# Patient Record
Sex: FEMALE | Race: WHITE | Employment: UNEMPLOYED | URBAN - METROPOLITAN AREA
[De-identification: names, ages, dates, MRNs, and addresses within clinical notes are randomized per-mention and may not be internally consistent; named-entity substitution may affect disease eponyms.]

---
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
## 2022-01-05 ENCOUNTER — APPOINTMENT (OUTPATIENT)
Dept: GENERAL RADIOLOGY | Facility: HOSPITAL | Age: 22
End: 2022-01-05
Attending: EMERGENCY MEDICINE
Payer: MEDICAID

## 2022-01-05 ENCOUNTER — HOSPITAL ENCOUNTER (EMERGENCY)
Facility: HOSPITAL | Age: 22
Discharge: HOME | End: 2022-01-05
Attending: EMERGENCY MEDICINE
Payer: MEDICAID

## 2022-01-05 VITALS
OXYGEN SATURATION: 96 % | RESPIRATION RATE: 18 BRPM | HEART RATE: 83 BPM | DIASTOLIC BLOOD PRESSURE: 85 MMHG | SYSTOLIC BLOOD PRESSURE: 127 MMHG | TEMPERATURE: 98 F

## 2022-01-05 DIAGNOSIS — R07.9 CHEST PAIN OF UNCERTAIN ETIOLOGY: Primary | ICD-10-CM

## 2022-01-05 LAB
ANION GAP SERPL CALC-SCNC: 6 MMOL/L (ref 0–18)
BASOPHILS # BLD AUTO: 0.03 X10(3) UL (ref 0–0.2)
BASOPHILS NFR BLD AUTO: 0.3 %
BUN BLD-MCNC: 7 MG/DL (ref 7–18)
BUN/CREAT SERPL: 10 (ref 10–20)
CALCIUM BLD-MCNC: 8.9 MG/DL (ref 8.5–10.1)
CHLORIDE SERPL-SCNC: 107 MMOL/L (ref 98–112)
CO2 SERPL-SCNC: 26 MMOL/L (ref 21–32)
CREAT BLD-MCNC: 0.7 MG/DL
DEPRECATED RDW RBC AUTO: 38.9 FL (ref 35.1–46.3)
EOSINOPHIL # BLD AUTO: 0.13 X10(3) UL (ref 0–0.7)
EOSINOPHIL NFR BLD AUTO: 1.4 %
ERYTHROCYTE [DISTWIDTH] IN BLOOD BY AUTOMATED COUNT: 13.5 % (ref 11–15)
GLUCOSE BLD-MCNC: 96 MG/DL (ref 70–99)
HCT VFR BLD AUTO: 42.8 %
HGB BLD-MCNC: 13.7 G/DL
IMM GRANULOCYTES # BLD AUTO: 0.04 X10(3) UL (ref 0–1)
IMM GRANULOCYTES NFR BLD: 0.4 %
LYMPHOCYTES # BLD AUTO: 2.03 X10(3) UL (ref 1–4)
LYMPHOCYTES NFR BLD AUTO: 22.6 %
MCH RBC QN AUTO: 25.6 PG (ref 26–34)
MCHC RBC AUTO-ENTMCNC: 32 G/DL (ref 31–37)
MCV RBC AUTO: 80 FL
MONOCYTES # BLD AUTO: 0.7 X10(3) UL (ref 0.1–1)
MONOCYTES NFR BLD AUTO: 7.8 %
NEUTROPHILS # BLD AUTO: 6.05 X10 (3) UL (ref 1.5–7.7)
NEUTROPHILS # BLD AUTO: 6.05 X10(3) UL (ref 1.5–7.7)
NEUTROPHILS NFR BLD AUTO: 67.5 %
OSMOLALITY SERPL CALC.SUM OF ELEC: 286 MOSM/KG (ref 275–295)
PLATELET # BLD AUTO: 271 10(3)UL (ref 150–450)
POTASSIUM SERPL-SCNC: 3.8 MMOL/L (ref 3.5–5.1)
RBC # BLD AUTO: 5.35 X10(6)UL
SODIUM SERPL-SCNC: 139 MMOL/L (ref 136–145)
TROPONIN I HIGH SENSITIVITY: 5 NG/L
WBC # BLD AUTO: 9 X10(3) UL (ref 4–11)

## 2022-01-05 PROCEDURE — 93010 ELECTROCARDIOGRAM REPORT: CPT | Performed by: EMERGENCY MEDICINE

## 2022-01-05 PROCEDURE — 80048 BASIC METABOLIC PNL TOTAL CA: CPT | Performed by: EMERGENCY MEDICINE

## 2022-01-05 PROCEDURE — 85025 COMPLETE CBC W/AUTO DIFF WBC: CPT | Performed by: EMERGENCY MEDICINE

## 2022-01-05 PROCEDURE — 93005 ELECTROCARDIOGRAM TRACING: CPT

## 2022-01-05 PROCEDURE — 36415 COLL VENOUS BLD VENIPUNCTURE: CPT

## 2022-01-05 PROCEDURE — 71045 X-RAY EXAM CHEST 1 VIEW: CPT | Performed by: EMERGENCY MEDICINE

## 2022-01-05 PROCEDURE — 99284 EMERGENCY DEPT VISIT MOD MDM: CPT

## 2022-01-05 PROCEDURE — 84484 ASSAY OF TROPONIN QUANT: CPT | Performed by: EMERGENCY MEDICINE

## 2022-01-05 NOTE — ED INITIAL ASSESSMENT (HPI)
Pt reports chest pain, hx of anxiety, panic disorder, pt reports sore mouth and throat, pt currently being treated for bad tooth, pt reports \"I took my anxiety medication but may have given additional dose because she couldn't remember if she took her usu

## 2022-01-05 NOTE — ED PROVIDER NOTES
TightnessPatient [facility] in[facility] AND [facility] Emergency Department    History   Patient presents with:  Chest Pain Angina      HPI    25-year-old female presents the ER with complaint of. Patient states that the pain started earlier this morning.   Patient b my examination was cleaned with super sani-cloth germicidal wipes following the exam.     Physical Exam  Vitals and nursing note reviewed. Constitutional:       Appearance: She is well-developed. HENT:      Head: Normocephalic and atraumatic.       Righ orders. EKG    Rate, intervals and axes as noted on EKG Report. Rate: 94  Rhythm: Sinus Rhythm  Reading: No ST deviation.              Imaging Results Available and Reviewed while in ED: XR CHEST AP PORTABLE  (CPT=71045)    Result Date: 1/5/2022  CONCL visit  If symptoms worsen      Medications Prescribed:  Current Discharge Medication List

## 2022-07-27 ENCOUNTER — APPOINTMENT (OUTPATIENT)
Dept: CT IMAGING | Facility: HOSPITAL | Age: 22
End: 2022-07-27
Attending: NURSE PRACTITIONER
Payer: MEDICAID

## 2022-07-27 ENCOUNTER — HOSPITAL ENCOUNTER (EMERGENCY)
Facility: HOSPITAL | Age: 22
Discharge: HOME | End: 2022-07-27
Payer: MEDICAID

## 2022-07-27 VITALS
WEIGHT: 205 LBS | SYSTOLIC BLOOD PRESSURE: 111 MMHG | RESPIRATION RATE: 18 BRPM | DIASTOLIC BLOOD PRESSURE: 72 MMHG | HEART RATE: 69 BPM | HEIGHT: 64 IN | BODY MASS INDEX: 35 KG/M2 | TEMPERATURE: 98 F | OXYGEN SATURATION: 99 %

## 2022-07-27 DIAGNOSIS — R51.9 ACUTE NONINTRACTABLE HEADACHE, UNSPECIFIED HEADACHE TYPE: Primary | ICD-10-CM

## 2022-07-27 LAB — B-HCG UR QL: NEGATIVE

## 2022-07-27 PROCEDURE — 81025 URINE PREGNANCY TEST: CPT

## 2022-07-27 PROCEDURE — 96374 THER/PROPH/DIAG INJ IV PUSH: CPT

## 2022-07-27 PROCEDURE — 96361 HYDRATE IV INFUSION ADD-ON: CPT

## 2022-07-27 PROCEDURE — 99284 EMERGENCY DEPT VISIT MOD MDM: CPT

## 2022-07-27 PROCEDURE — 96375 TX/PRO/DX INJ NEW DRUG ADDON: CPT

## 2022-07-27 PROCEDURE — 70450 CT HEAD/BRAIN W/O DYE: CPT | Performed by: NURSE PRACTITIONER

## 2022-07-27 RX ORDER — METOCLOPRAMIDE HYDROCHLORIDE 5 MG/ML
10 INJECTION INTRAMUSCULAR; INTRAVENOUS ONCE
Status: COMPLETED | OUTPATIENT
Start: 2022-07-27 | End: 2022-07-27

## 2022-07-27 RX ORDER — ONDANSETRON 2 MG/ML
4 INJECTION INTRAMUSCULAR; INTRAVENOUS ONCE
Status: COMPLETED | OUTPATIENT
Start: 2022-07-27 | End: 2022-07-27

## 2022-07-27 RX ORDER — DIPHENHYDRAMINE HYDROCHLORIDE 50 MG/ML
25 INJECTION INTRAMUSCULAR; INTRAVENOUS ONCE
Status: COMPLETED | OUTPATIENT
Start: 2022-07-27 | End: 2022-07-27

## 2022-10-14 NOTE — ED AVS SNAPSHOT
Francy Jan   MRN: [id number]    Department:  [facility] Emergency Department   Date of Visit:  11/24/2019           Disclosure     Insurance plans vary and the physician(s) referred by the ER may not be covered by your plan.  Please contact your CARE PHYSICIAN AT ONCE OR RETURN IMMEDIATELY TO THE EMERGENCY DEPARTMENT. If you have been prescribed any medication(s), please fill your prescription right away and begin taking the medication(s) as directed.   If you believe that any of the medications Were Done

## 2025-03-21 ENCOUNTER — OFFICE VISIT (OUTPATIENT)
Dept: OBGYN CLINIC | Facility: CLINIC | Age: 25
End: 2025-03-21

## 2025-03-21 VITALS
BODY MASS INDEX: 40.12 KG/M2 | SYSTOLIC BLOOD PRESSURE: 116 MMHG | WEIGHT: 235 LBS | DIASTOLIC BLOOD PRESSURE: 74 MMHG | HEART RATE: 93 BPM | HEIGHT: 64 IN

## 2025-03-21 DIAGNOSIS — N97.0 ANOVULATORY BLEEDING: Primary | ICD-10-CM

## 2025-03-21 PROCEDURE — 99203 OFFICE O/P NEW LOW 30 MIN: CPT | Performed by: STUDENT IN AN ORGANIZED HEALTH CARE EDUCATION/TRAINING PROGRAM

## 2025-03-21 RX ORDER — DROSPIRENONE 4 MG/1
1 TABLET, FILM COATED ORAL DAILY
Qty: 84 TABLET | Refills: 4 | Status: CANCELLED | OUTPATIENT
Start: 2025-03-21 | End: 2026-03-21

## 2025-03-21 RX ORDER — SERTRALINE HYDROCHLORIDE 100 MG/1
200 TABLET, FILM COATED ORAL DAILY
COMMUNITY
Start: 2023-01-03

## 2025-03-21 RX ORDER — METFORMIN HYDROCHLORIDE 500 MG/1
500 TABLET, EXTENDED RELEASE ORAL DAILY
COMMUNITY
Start: 2024-09-11

## 2025-03-21 RX ORDER — MEDROXYPROGESTERONE ACETATE 10 MG
TABLET ORAL
Qty: 10 TABLET | Refills: 5 | Status: SHIPPED | OUTPATIENT
Start: 2025-03-21

## 2025-03-21 RX ORDER — TIRZEPATIDE 5 MG/.5ML
5 INJECTION, SOLUTION SUBCUTANEOUS WEEKLY
COMMUNITY
Start: 2025-03-17 | End: 2025-03-21

## 2025-03-21 RX ORDER — SUMATRIPTAN 50 MG/1
50 TABLET, FILM COATED ORAL
COMMUNITY
Start: 2023-03-19

## 2025-03-21 RX ORDER — CLONAZEPAM 0.5 MG/1
1 TABLET ORAL AS DIRECTED
COMMUNITY
Start: 2022-12-12

## 2025-03-21 RX ORDER — ATORVASTATIN CALCIUM 80 MG/1
80 TABLET, FILM COATED ORAL NIGHTLY
COMMUNITY
Start: 2024-05-01

## 2025-03-21 RX ORDER — NYSTATIN 100000 [USP'U]/G
3 POWDER TOPICAL 3 TIMES DAILY PRN
Qty: 60 G | Refills: 0 | Status: SHIPPED | OUTPATIENT
Start: 2025-03-21

## 2025-03-21 RX ORDER — BUPROPION HYDROCHLORIDE 150 MG/1
2 TABLET ORAL AS DIRECTED
COMMUNITY
Start: 2022-09-15

## 2025-03-21 RX ORDER — SPIRONOLACTONE 25 MG/1
TABLET ORAL
COMMUNITY
Start: 2025-03-17 | End: 2025-06-15

## 2025-03-21 NOTE — PROGRESS NOTES
[facility]  Obstetrics and Gynecology  Gynecology New Patient  Exam    No chief complaint on file.        Nicky Gomez is a 24 year old female presenting as a new patient for AUB and pain.    Right sided pain and now intermittently right and left (but more right). Worse when laying on hip, but better when laying on back or stomach. Has frequent migraines for which takes sumatriptan, doesn't usually take anything else.     Felt similar pain when ruptured cyst. Feels when sitting for too long.     Also taking spironolactone for facial hair - helping.     Trying to lose weight; has inflammation and odor under pannus or with other redundant skin. 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     Menstrual/Gyn Hx:   Menarche 12  Periods irregular (longest was 1 year without in HS), but now more regular. Usually getting every 3 months. No cramping when gets it. Still taking metformin (which helped make more regular and less heavy).   Reports no h/o abnormal Pap. Has never had Pap per patient but 04/2034 NILM at [facility] in [facility]. No h/o STI. Has never been sexually active.   Menstrual frequency was less during depressive episode (during weight loss).   Reports  h/o fibroids or ovarian cysts. Has not had any type of GYN surgery. US at [facility] with ? Cysts on ovary.   Has  used birth control in the past; pills during sandra year high school but didn't like how it felt.   Is not currently sexually active, has not been sexually active.   Ultimately desires future fertility  Reports no significant issues with bowel or bladder function   Migraines with aura    Focused Fhx:   No fhx of cancer:  + h/o VTE in family - both parents (mom had RPL with some underlying blood issus, likely APLS)      Medications (Active prior to today's visit):  Current Outpatient Medications   Medication Sig Dispense Refill    atorvastatin 80 MG Oral Tab Take 1 tablet (80 mg total) by mouth nightly.      buPROPion  MG Oral Tablet 24 Hr  Take 2 tablets (300 mg total) by mouth As Directed.      metFORMIN  MG Oral Tablet 24 Hr Take 1 tablet (500 mg total) by mouth daily.      spironolactone 25 MG Oral Tab Take by mouth.      SUMAtriptan 50 MG Oral Tab Take 1 tablet (50 mg total) by mouth.      nystatin 477499 UNIT/GM External Powder Apply 3 g topically 3 (three) times daily as needed for Itching. 60 g 0    medroxyPROGESTERone Acetate 10 MG Oral Tab Take one tablet nightly for 10 days every 60 days. 10 tablet 5    Clobetasol Propionate 0.05 % External Ointment Apply topically.      clonazePAM 0.5 MG Oral Tab Take 1 tablet (0.5 mg total) by mouth As Directed. (Patient not taking: Reported on 3/21/2025)      sertraline 100 MG Oral Tab Take 2 tablets (200 mg total) by mouth daily. (Patient not taking: Reported on 3/21/2025)      Clobetasol Propionate 0.05 % External Solution Apply topically. (Patient not taking: Reported on 3/21/2025)      Albuterol Sulfate  (90 Base) MCG/ACT Inhalation Aero Soln Inhale 2 puffs into the lungs. (Patient not taking: Reported on 3/21/2025)       Allergies:  Allergies[1]  HISTORY:     OB History    Para Term  AB Living   0 0 0 0 0 0   SAB IAB Ectopic Multiple Live Births   0 0 0 0 0       Past Medical History:    Migraines       No past surgical history on file.    No family history on file.    Social History     Socioeconomic History    Marital status: Single     Spouse name: Not on file    Number of children: Not on file    Years of education: Not on file    Highest education level: Not on file   Occupational History    Not on file   Tobacco Use    Smoking status: Never    Smokeless tobacco: Never   Vaping Use    Vaping status: Never Used   Substance and Sexual Activity    Alcohol use: Not Currently    Drug use: Yes     Types: Cannabis    Sexual activity: Not on file   Other Topics Concern    Not on file   Social History Narrative    Not on file     Social Drivers of Health     Food Insecurity: No  Food Insecurity (5/14/2024)    Received from [facility]    Food Insecurity     Currently or in the past 3 months, have you worried your food would run out before you had money to buy more?: No     In the past 12 months, have you run out of food or been unable to get more?: No   Transportation Needs: No Transportation Needs (5/14/2024)    Received from [facility]    Transportation Needs     Currently or in the past 3 months, has lack of transportation kept you from medical appointments, getting food or medicine, or providing care to a family member?: Unrecognized value     : Not on file     Medical Transportation Needs?: No     Daily Living Transportation Needs? [Peds Only] : Not on file   Stress: Not on file   Housing Stability: Low Risk  (7/8/2021)    Received from [facility]    Housing Stability     Mortgage Payment Concerns?: Not on file     Number of Places Lived in the Last Year: Not on file     Unstable Housing?: Not on file       ROS:   Review of Systems:    General: no fevers, chills, unintended weight loss/gain except as above  Cardiovascular: no chest pain, new or unexplained SOB except as above  Gastrointestinal: no nausea/vomiting, diarrhea, or blood in stool except as above  Respiratory: no new symptoms reported except as above  Skin: no new symptoms reported except as above  Psychiatric: no new symptoms reported except as above  PHYSICAL EXAM:   /74   Pulse 93   Ht 5' 4\" (1.626 m)   Wt 235 lb (106.6 kg)   LMP 02/20/2025   BMI 40.34 kg/m²     Physical Exam  Constitutional:       Appearance: She is obese.   HENT:      Head: Normocephalic and atraumatic.   Eyes:      Extraocular Movements: Extraocular movements intact.   Pulmonary:      Effort: Pulmonary effort is normal.   Abdominal:      Palpations: Abdomen is soft.      Comments: Erythema and chronic moistrure   Genitourinary:     Comments: R and L adnexal discomfort on bimanual  with pronouned RLQ pain with pelvic floor muscle palpation   Musculoskeletal:      Cervical back: Normal range of motion.   Skin:     General: Skin is warm and dry.      Comments: Facial hirsutism   Neurological:      General: No focal deficit present.      Mental Status: She is alert. Mental status is at baseline.   Psychiatric:         Behavior: Behavior normal.         Thought Content: Thought content normal.             RESULTS & IMAGING         No results for input(s): \"URINEPREG\" in the last 72 hours.    No results for input(s): \"PGLU\", \"POCTGLUCOSE\" in the last 72 hours.    No results for input(s): \"GLUCOSEDIP\", \"BILIRUBIN\", \"KETONESDIP\", \"BLOODU\", \"PHURINE\", \"UROBILIN\", \"NITRITE\", \"LEUKOCYTES\", \"APPEARANCE\", \"URINECOLOR\" in the last 72 hours.    Invalid input(s): \"SPECGRAV\"       ASSESSMENT & PLAN     Anovulatory bleeding (Primary)  -     17-OH-Progesterone; Future; Expected date: 03/21/2025  -     Prolactin; Future; Expected date: 03/21/2025  -     [employer] US PELVIS (TRANSABDOMINAL AND TRANSVAGINAL) (CPT=76856/28791) [[id number]]; Future; Expected date: 03/21/2025  -     [facility] Weight Management Medical Nutrition Therapy - DIETITIAN [facility] Location  -     [employer] US PELVIS (TRANSABDOMINAL AND TRANSVAGINAL) (CPT=76856/43868) [9004061]  -     Basic Metabolic Panel (8); Future; Expected date: 03/21/2025  Other orders  -     Nystatin; Apply 3 g topically 3 (three) times daily as needed for Itching.  Dispense: 60 g; Refill: 0  -     medroxyPROGESTERone Acetate; Take one tablet nightly for 10 days every 60 days.  Dispense: 10 tablet; Refill: 5      Likely PCOS based on history - labs ordered  US given pain and menstrual irregularity  Nystatin to prevent candida in redundant skin  Nutritionist to help with weight loss to facilitate menstrual regularity  Provera to induce appropriate withdrawal bleeds for endometrial protection    Tg Franks MD  3/21/2025  3:31 PM               [1]    Allergies  Allergen Reactions    Oseltamivir RASH, NAUSEA AND VOMITING and NAUSEA ONLY

## 2025-04-03 PROBLEM — N97.0 ANOVULATORY BLEEDING: Status: ACTIVE | Noted: 2025-04-03
